# Patient Record
Sex: FEMALE | Race: WHITE | NOT HISPANIC OR LATINO
[De-identification: names, ages, dates, MRNs, and addresses within clinical notes are randomized per-mention and may not be internally consistent; named-entity substitution may affect disease eponyms.]

---

## 2019-03-08 PROBLEM — Z00.00 ENCOUNTER FOR PREVENTIVE HEALTH EXAMINATION: Status: ACTIVE | Noted: 2019-03-08

## 2019-03-16 ENCOUNTER — RECORD ABSTRACTING (OUTPATIENT)
Age: 54
End: 2019-03-16

## 2019-03-16 DIAGNOSIS — Z82.49 FAMILY HISTORY OF ISCHEMIC HEART DISEASE AND OTHER DISEASES OF THE CIRCULATORY SYSTEM: ICD-10-CM

## 2019-03-16 DIAGNOSIS — Z82.3 FAMILY HISTORY OF STROKE: ICD-10-CM

## 2019-03-16 LAB — CYTOLOGY CVX/VAG DOC THIN PREP: NORMAL

## 2019-03-16 RX ORDER — BACILLUS COAGULANS/INULIN 1B-250 MG
CAPSULE ORAL
Refills: 0 | Status: ACTIVE | COMMUNITY

## 2019-03-16 RX ORDER — UBIDECARENONE 200 MG
CAPSULE ORAL
Refills: 0 | Status: ACTIVE | COMMUNITY

## 2019-06-24 ENCOUNTER — APPOINTMENT (OUTPATIENT)
Dept: OBGYN | Facility: CLINIC | Age: 54
End: 2019-06-24
Payer: COMMERCIAL

## 2019-06-24 VITALS
BODY MASS INDEX: 21.49 KG/M2 | SYSTOLIC BLOOD PRESSURE: 118 MMHG | WEIGHT: 129 LBS | HEIGHT: 65 IN | DIASTOLIC BLOOD PRESSURE: 60 MMHG

## 2019-06-24 PROCEDURE — 99396 PREV VISIT EST AGE 40-64: CPT

## 2019-06-25 ENCOUNTER — TRANSCRIPTION ENCOUNTER (OUTPATIENT)
Age: 54
End: 2019-06-25

## 2020-11-23 ENCOUNTER — APPOINTMENT (OUTPATIENT)
Dept: OBGYN | Facility: CLINIC | Age: 55
End: 2020-11-23
Payer: COMMERCIAL

## 2020-11-23 VITALS
SYSTOLIC BLOOD PRESSURE: 116 MMHG | HEIGHT: 65 IN | DIASTOLIC BLOOD PRESSURE: 72 MMHG | WEIGHT: 132 LBS | BODY MASS INDEX: 21.99 KG/M2

## 2020-11-23 DIAGNOSIS — B00.9 HERPESVIRAL INFECTION, UNSPECIFIED: ICD-10-CM

## 2020-11-23 PROCEDURE — 99396 PREV VISIT EST AGE 40-64: CPT

## 2020-11-23 RX ORDER — ONDANSETRON 4 MG/1
4 TABLET, ORALLY DISINTEGRATING ORAL
Qty: 30 | Refills: 0 | Status: ACTIVE | COMMUNITY
Start: 2020-10-30

## 2020-11-23 NOTE — HISTORY OF PRESENT ILLNESS
[Patient reported colonoscopy was normal] : Patient reported colonoscopy was normal [FreeTextEntry1] : 53yo  postmenopausal without HRT here for annual Gyn exam. Pt reports recurrence of vulvar itching 2 months ago- used Clobetasol with resolution but then had "tingling" which she believes was HSV recurrence. Took old Valtrex with resolution of sx. Would like a new rx for Valtrex. Had stereotactic left breast bx 2017-> benign.  \par  [Mammogramdate] : 10/21/20 [TextBox_19] [facility] [TextBox_25] : BIR[facility] 1 [PapSmeardate] : 4/26/18 [TextBox_31] : Neg/HPV Neg [ColonoscopyDate] : 2018

## 2021-05-17 ENCOUNTER — RX RENEWAL (OUTPATIENT)
Age: 56
End: 2021-05-17

## 2022-03-18 DIAGNOSIS — Z12.39 ENCOUNTER FOR OTHER SCREENING FOR MALIGNANT NEOPLASM OF BREAST: ICD-10-CM

## 2022-07-22 ENCOUNTER — NON-APPOINTMENT (OUTPATIENT)
Age: 57
End: 2022-07-22

## 2022-07-27 ENCOUNTER — APPOINTMENT (OUTPATIENT)
Dept: OBGYN | Facility: CLINIC | Age: 57
End: 2022-07-27

## 2022-07-27 VITALS
DIASTOLIC BLOOD PRESSURE: 70 MMHG | WEIGHT: 127 LBS | HEIGHT: 65 IN | BODY MASS INDEX: 21.16 KG/M2 | SYSTOLIC BLOOD PRESSURE: 118 MMHG

## 2022-07-27 DIAGNOSIS — N90.4 LEUKOPLAKIA OF VULVA: ICD-10-CM

## 2022-07-27 DIAGNOSIS — Z78.0 ASYMPTOMATIC MENOPAUSAL STATE: ICD-10-CM

## 2022-07-27 PROCEDURE — 99396 PREV VISIT EST AGE 40-64: CPT

## 2022-07-27 RX ORDER — CLOBETASOL PROPIONATE 0.5 MG/G
0.05 OINTMENT TOPICAL TWICE DAILY
Qty: 1 | Refills: 2 | Status: ACTIVE | COMMUNITY

## 2022-07-27 RX ORDER — LEVOCETIRIZINE DIHYDROCHLORIDE 5 MG/1
5 TABLET ORAL
Refills: 0 | Status: ACTIVE | COMMUNITY

## 2022-07-27 RX ORDER — LEVOTHYROXINE SODIUM 100 UG/1
100 TABLET ORAL
Qty: 90 | Refills: 0 | Status: ACTIVE | COMMUNITY
Start: 2022-07-07

## 2022-07-27 RX ORDER — MONTELUKAST 10 MG/1
10 TABLET, FILM COATED ORAL
Refills: 0 | Status: ACTIVE | COMMUNITY

## 2022-07-27 NOTE — HISTORY OF PRESENT ILLNESS
[FreeTextEntry1] : 55yo  postmenopausal without HRT here for annual Gyn exam. Pt with lichen sclerosis, has not had any symptoms of itching recently. Has h/o HSV no outbreaks in the past 2 years. Had stereotactic left breast bx 2017-> benign. Is S/P total thyroidectomy for multinodular goiter(no Ca)\par \par \par OB History\par Total pregnancies  1.  Total living children  1.  C section(s)  1.  Pregnancy 1:  2013, Primary , Female, 6lbs 7 oz ( IVF conception, this pregnancy was the result of donor ( age 20) egg IVF with initial twin gestation (vanishing twin), "Ginger" (Jayme).  \par  [Mammogramdate] : 6/13/22 [TextBox_19] : [facility] [PapSmeardate] : 4/26/18 [TextBox_31] : Neg/HPV Neg [ColonoscopyDate] : 5/29/18

## 2022-07-31 LAB — HPV HIGH+LOW RISK DNA PNL CVX: NOT DETECTED

## 2022-08-09 LAB — CYTOLOGY CVX/VAG DOC THIN PREP: ABNORMAL

## 2022-10-11 ENCOUNTER — RX RENEWAL (OUTPATIENT)
Age: 57
End: 2022-10-11

## 2022-10-11 RX ORDER — CLOBETASOL PROPIONATE 0.5 MG/G
0.05 OINTMENT TOPICAL TWICE DAILY
Qty: 60 | Refills: 2 | Status: ACTIVE | COMMUNITY
Start: 2022-10-11

## 2023-04-17 DIAGNOSIS — R92.2 INCONCLUSIVE MAMMOGRAM: ICD-10-CM

## 2023-04-29 ENCOUNTER — NON-APPOINTMENT (OUTPATIENT)
Age: 58
End: 2023-04-29

## 2023-05-18 ENCOUNTER — RX RENEWAL (OUTPATIENT)
Age: 58
End: 2023-05-18

## 2023-05-23 RX ORDER — VALACYCLOVIR 1 G/1
1 TABLET, FILM COATED ORAL
Qty: 20 | Refills: 3 | Status: ACTIVE | COMMUNITY
Start: 2020-11-23

## 2023-07-27 ENCOUNTER — APPOINTMENT (OUTPATIENT)
Dept: OBGYN | Facility: CLINIC | Age: 58
End: 2023-07-27
Payer: COMMERCIAL

## 2023-07-27 VITALS
WEIGHT: 132 LBS | SYSTOLIC BLOOD PRESSURE: 110 MMHG | DIASTOLIC BLOOD PRESSURE: 80 MMHG | HEIGHT: 65 IN | BODY MASS INDEX: 21.99 KG/M2

## 2023-07-27 DIAGNOSIS — Z01.419 ENCOUNTER FOR GYNECOLOGICAL EXAMINATION (GENERAL) (ROUTINE) W/OUT ABNORMAL FINDINGS: ICD-10-CM

## 2023-07-27 PROCEDURE — 99396 PREV VISIT EST AGE 40-64: CPT

## 2023-07-27 NOTE — HISTORY OF PRESENT ILLNESS
[FreeTextEntry1] : 56yo  postmenopausal without HRT here for annual Gyn exam. Pt with lichen sclerosis, has not had any symptoms of itching recently. Uses Clobetasol ointment when symptomatic. Has h/o HSV no outbreaks several years. Had stereotactic left breast bx 2017-> benign. Is S/P total thyroidectomy for multinodular goiter(no Ca). Followed by endocrinologist, has never had BMD. Daughter, Jayme, is going into 5th grade- loves art and ice skating.\par \par \par OB History\par Total pregnancies 1. Total living children 1. C section(s) 1. Pregnancy 1: 2013, Primary , Female, 6lbs 7 oz ( IVF conception, this pregnancy was the result of donor ( age 22) egg IVF with initial twin gestation (vanishing twin), "Ginger" (Jayme). \par \par \par Preventative Visit: \par Mammogram: 20, [facility]. \par PAP Smear:22, Neg/HPV Neg. \par Colonoscopy: 18. \par

## 2023-08-17 ENCOUNTER — RESULT REVIEW (OUTPATIENT)
Age: 58
End: 2023-08-17

## 2024-08-27 ENCOUNTER — NON-APPOINTMENT (OUTPATIENT)
Age: 59
End: 2024-08-27

## 2024-08-28 ENCOUNTER — APPOINTMENT (OUTPATIENT)
Dept: OBGYN | Facility: CLINIC | Age: 59
End: 2024-08-28
Payer: COMMERCIAL

## 2024-08-28 VITALS
WEIGHT: 134 LBS | HEIGHT: 65 IN | SYSTOLIC BLOOD PRESSURE: 140 MMHG | DIASTOLIC BLOOD PRESSURE: 90 MMHG | BODY MASS INDEX: 22.33 KG/M2

## 2024-08-28 DIAGNOSIS — Z01.419 ENCOUNTER FOR GYNECOLOGICAL EXAMINATION (GENERAL) (ROUTINE) W/OUT ABNORMAL FINDINGS: ICD-10-CM

## 2024-08-28 PROCEDURE — 99396 PREV VISIT EST AGE 40-64: CPT

## 2024-08-28 NOTE — HISTORY OF PRESENT ILLNESS
[FreeTextEntry1] : 59yo  postmenopausal without HRT here for annual Gyn exam. Pt with lichen sclerosis, has not had any symptoms of itching recently. Uses Clobetasol ointment when symptomatic. Has h/o HSV no outbreaks several years. Had stereotactic left breast bx 2017-> benign. Is S/P total thyroidectomy for multinodular goiter(no Ca). Followed by endocrinologist, had 1st BMD last year. Daughter, Jayme, is 11, ice skating competitively- just won [employer] at [employer]!       OB History  Total pregnancies 1. Total living children 1. C section(s) 1. Pregnancy 1: 2013, Primary , Female, 6lbs 7 oz ( IVF conception, this pregnancy was the result of donor ( age 20) egg IVF with initial twin gestation (vanishing twin), "Ginger" (Jayme).      Preventative Visit: Mammogram: 20, [facility]. PAP Smear:22, Neg/HPV Neg. BMD: 23 T Score Spine -1.0  Hip -0.4 Fem Neck -0.8 Colonoscopy: 18.

## 2025-09-11 ENCOUNTER — RX RENEWAL (OUTPATIENT)
Age: 60
End: 2025-09-11